# Patient Record
Sex: FEMALE | Race: WHITE | NOT HISPANIC OR LATINO
[De-identification: names, ages, dates, MRNs, and addresses within clinical notes are randomized per-mention and may not be internally consistent; named-entity substitution may affect disease eponyms.]

---

## 2021-09-13 ENCOUNTER — TRANSCRIPTION ENCOUNTER (OUTPATIENT)
Age: 41
End: 2021-09-13

## 2022-05-10 ENCOUNTER — NON-APPOINTMENT (OUTPATIENT)
Age: 42
End: 2022-05-10

## 2022-05-10 PROBLEM — Z00.00 ENCOUNTER FOR PREVENTIVE HEALTH EXAMINATION: Status: ACTIVE | Noted: 2022-05-10

## 2022-05-11 ENCOUNTER — APPOINTMENT (OUTPATIENT)
Dept: INTERNAL MEDICINE | Facility: CLINIC | Age: 42
End: 2022-05-11
Payer: COMMERCIAL

## 2022-05-11 ENCOUNTER — NON-APPOINTMENT (OUTPATIENT)
Age: 42
End: 2022-05-11

## 2022-05-11 VITALS
HEART RATE: 83 BPM | WEIGHT: 143 LBS | HEIGHT: 70 IN | DIASTOLIC BLOOD PRESSURE: 70 MMHG | BODY MASS INDEX: 20.47 KG/M2 | SYSTOLIC BLOOD PRESSURE: 105 MMHG | OXYGEN SATURATION: 99 % | TEMPERATURE: 97.6 F

## 2022-05-11 DIAGNOSIS — K62.3 RECTAL PROLAPSE: ICD-10-CM

## 2022-05-11 DIAGNOSIS — R92.2 INCONCLUSIVE MAMMOGRAM: ICD-10-CM

## 2022-05-11 DIAGNOSIS — Z83.438 FAMILY HISTORY OF OTHER DISORDER OF LIPOPROTEIN METABOLISM AND OTHER LIPIDEMIA: ICD-10-CM

## 2022-05-11 DIAGNOSIS — Z11.9 ENCOUNTER FOR SCREENING FOR INFECTIOUS AND PARASITIC DISEASES, UNSPECIFIED: ICD-10-CM

## 2022-05-11 DIAGNOSIS — Z12.39 ENCOUNTER FOR OTHER SCREENING FOR MALIGNANT NEOPLASM OF BREAST: ICD-10-CM

## 2022-05-11 DIAGNOSIS — Z00.00 ENCOUNTER FOR GENERAL ADULT MEDICAL EXAMINATION W/OUT ABNORMAL FINDINGS: ICD-10-CM

## 2022-05-11 DIAGNOSIS — R10.9 UNSPECIFIED ABDOMINAL PAIN: ICD-10-CM

## 2022-05-11 DIAGNOSIS — Z80.8 FAMILY HISTORY OF MALIGNANT NEOPLASM OF OTHER ORGANS OR SYSTEMS: ICD-10-CM

## 2022-05-11 DIAGNOSIS — Z80.3 FAMILY HISTORY OF MALIGNANT NEOPLASM OF BREAST: ICD-10-CM

## 2022-05-11 DIAGNOSIS — G89.29 UNSPECIFIED ABDOMINAL PAIN: ICD-10-CM

## 2022-05-11 PROCEDURE — 99214 OFFICE O/P EST MOD 30 MIN: CPT | Mod: 25

## 2022-05-11 PROCEDURE — G0444 DEPRESSION SCREEN ANNUAL: CPT

## 2022-05-11 PROCEDURE — 93000 ELECTROCARDIOGRAM COMPLETE: CPT | Mod: 59

## 2022-05-11 PROCEDURE — G0442 ANNUAL ALCOHOL SCREEN 15 MIN: CPT

## 2022-05-11 PROCEDURE — 36415 COLL VENOUS BLD VENIPUNCTURE: CPT

## 2022-05-11 PROCEDURE — 99386 PREV VISIT NEW AGE 40-64: CPT | Mod: 25

## 2022-05-11 RX ORDER — PSYLLIUM SEED
63 PACKET (EA) ORAL
Refills: 0 | Status: ACTIVE | COMMUNITY
Start: 2022-05-11

## 2022-05-11 NOTE — PHYSICAL EXAM
[No Acute Distress] : no acute distress [Normal Sclera/Conjunctiva] : normal sclera/conjunctiva [PERRL] : pupils equal round and reactive to light [EOMI] : extraocular movements intact [No JVD] : no jugular venous distention [No Respiratory Distress] : no respiratory distress  [Clear to Auscultation] : lungs were clear to auscultation bilaterally [Normal Rate] : normal rate  [Regular Rhythm] : with a regular rhythm [Normal S1, S2] : normal S1 and S2 [No Murmur] : no murmur heard [No Carotid Bruits] : no carotid bruits [Pedal Pulses Present] : the pedal pulses are present [No Edema] : there was no peripheral edema [Soft] : abdomen soft [Non Tender] : non-tender [Normal Bowel Sounds] : normal bowel sounds [Declined Rectal Exam] : declined rectal exam [Normal Supraclavicular Nodes] : no supraclavicular lymphadenopathy [Normal Axillary Nodes] : no axillary lymphadenopathy [Normal Posterior Cervical Nodes] : no posterior cervical lymphadenopathy [Normal Anterior Cervical Nodes] : no anterior cervical lymphadenopathy [No CVA Tenderness] : no CVA  tenderness [No Spinal Tenderness] : no spinal tenderness [Normal] : affect was normal and insight and judgment were intact [No Joint Swelling] : no joint swelling [Grossly Normal Strength/Tone] : grossly normal strength/tone [No Rash] : no rash [Well-Appearing] : well-appearing [Normal Outer Ear/Nose] : the outer ears and nose were normal in appearance [Normal TMs] : both tympanic membranes were normal [No Lymphadenopathy] : no lymphadenopathy [No Nipple Discharge] : no nipple discharge [No Axillary Lymphadenopathy] : no axillary lymphadenopathy [de-identified] : Wears contacts [de-identified] : Dense breasts [de-identified] : Healed  scar [FreeTextEntry1] : Patient is having her menstrual cycle [de-identified] : Multiple moles.  Multiple tattoos

## 2022-05-11 NOTE — HEALTH RISK ASSESSMENT
[Very Good] : ~his/her~  mood as very good [Current] : Current [Yes] : Yes [2 - 4 times a month (2 pts)] : 2-4 times a month (2 points) [1 or 2 (0 pts)] : 1 or 2 (0 points) [Never (0 pts)] : Never (0 points) [No falls in past year] : Patient reported no falls in the past year [0] : 2) Feeling down, depressed, or hopeless: Not at all (0) [HIV Test offered] : HIV Test offered [Hepatitis C test offered] : Hepatitis C test offered [Graduate School] : graduate school [] :  [# Of Children ___] : has [unfilled] children [5-9] : 5-9 [PHQ-2 Negative - No further assessment needed] : PHQ-2 Negative - No further assessment needed [Audit-CScore] : 2 [VMF8Crmbp] : 0 [MammogramDate] : 2019 [PapSmearDate] : 2019 [BoneDensityDate] : N/A [ColonoscopyDate] : N/A [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

## 2022-05-11 NOTE — REVIEW OF SYSTEMS
[Palpitations] : palpitations [Redness] : redness [Abdominal Pain] : abdominal pain [Fever] : no fever [Chills] : no chills [Discharge] : no discharge [Pain] : no pain [Earache] : no earache [Hoarseness] : no hoarseness [Postnasal Drip] : no postnasal drip [Chest Pain] : no chest pain [Lower Ext Edema] : no lower extremity edema [Nausea] : no nausea [Vomiting] : no vomiting [Dysuria] : no dysuria [Incontinence] : no incontinence [Joint Pain] : no joint pain [Joint Stiffness] : no joint stiffness [Joint Swelling] : no joint swelling [Itching] : no itching [Mole Changes] : no mole changes [Skin Rash] : no skin rash [Headache] : no headache [Dizziness] : no dizziness [Suicidal] : not suicidal [Anxiety] : no anxiety [Depression] : no depression [Easy Bleeding] : no easy bleeding [Swollen Glands] : no swollen glands [FreeTextEntry3] : last eye exam 12/21 [FreeTextEntry7] : She has had intermittent constipation

## 2022-05-11 NOTE — HISTORY OF PRESENT ILLNESS
[FreeTextEntry1] : New patient/establish medical care [de-identified] : 42 y/o here to establish medical care\par 1. She has a a h/o Rectal Prolapse; uses Metamucil when constipated\par 2. She has intermittent pain in her left lower abdomen. Pain increases when she has to defecate.  \par US of Abdomen negative.\par 3.  12 yrs ago she had episodes of Palpitation.  She was seen by a Cardiologist.  24 Hour Holter by her patient's report with PVC.  She continues to have palpitations intermittently.   Of note, symptoms occur while she is in bed.  No nausea, dizziness, no chest pain.\par Had COVID Pfizer x 2; Moderna x 1\par

## 2022-05-24 ENCOUNTER — APPOINTMENT (OUTPATIENT)
Dept: SURGERY | Facility: CLINIC | Age: 42
End: 2022-05-24
Payer: COMMERCIAL

## 2022-05-24 VITALS
SYSTOLIC BLOOD PRESSURE: 134 MMHG | DIASTOLIC BLOOD PRESSURE: 87 MMHG | RESPIRATION RATE: 18 BRPM | OXYGEN SATURATION: 100 % | HEIGHT: 70 IN | WEIGHT: 142 LBS | BODY MASS INDEX: 20.33 KG/M2 | HEART RATE: 69 BPM

## 2022-05-24 DIAGNOSIS — Z87.891 PERSONAL HISTORY OF NICOTINE DEPENDENCE: ICD-10-CM

## 2022-05-24 DIAGNOSIS — Z86.59 PERSONAL HISTORY OF OTHER MENTAL AND BEHAVIORAL DISORDERS: ICD-10-CM

## 2022-05-24 DIAGNOSIS — K64.5 PERIANAL VENOUS THROMBOSIS: ICD-10-CM

## 2022-05-24 DIAGNOSIS — Z78.9 OTHER SPECIFIED HEALTH STATUS: ICD-10-CM

## 2022-05-24 PROCEDURE — 46600 DIAGNOSTIC ANOSCOPY SPX: CPT

## 2022-05-24 PROCEDURE — 99204 OFFICE O/P NEW MOD 45 MIN: CPT | Mod: 25

## 2022-05-24 RX ORDER — IBUPROFEN 200 MG/1
CAPSULE, LIQUID FILLED ORAL
Refills: 0 | Status: ACTIVE | COMMUNITY

## 2022-05-31 PROBLEM — K64.5 THROMBOSED EXTERNAL HEMORRHOID: Status: ACTIVE | Noted: 2022-05-31

## 2022-05-31 NOTE — HISTORY OF PRESENT ILLNESS
[FreeTextEntry1] : 41-year-old female here for initial evaluation for possible rectal prolapse.  Patient referred by Dr. Simental.  Patient has noted for a couple years now some tissue that comes out with bowel movements.  She believes it is relatively large in size but is not completely sure.  Denies any bleeding with bowel movements.  Denies any issues with fecal or urinary incontinence.  No prior colonoscopy.  No significant other medical history.  OB history significant for prior pregnancy with  no prior vaginal deliveries.  Patient thinks that she may feel some descent of the anterior compartment with bowel movements but she has not had significant uterine or vaginal prolapse.

## 2022-05-31 NOTE — PHYSICAL EXAM
[Abdomen Masses] : No abdominal masses [Abdomen Tenderness] : ~T No ~M abdominal tenderness [Excoriation] : no perianal excoriation [Fistula] : no fistulas [Tender, Swollen] : nontender, non-swollen [Normal] : was normal [None] : there was no rectal mass  [JVD] : no jugular venous distention  [Respiratory Effort] : normal respiratory effort [No Rash or Lesion] : No rash or lesion [Alert] : alert [Calm] : calm [de-identified] : Small left lateral thrombosed external hemorrhoid. [de-identified] : No acute distress

## 2022-05-31 NOTE — PROCEDURE
[FreeTextEntry1] : Anoscopy: Anoscopic exam performed with lighted anoscope.  Normal distal anorectal mucosa.  Mild to moderate internal hemorrhoids.  No masses identified.  No active bleeding.  Left lateral thrombosed hemorrhoid noted.\par \par Patient then proceeded to the bathroom to perform Valsalva maneuver such as an attempt to induce prolapse.  On examination the patient did not have significant full-thickness prolapse.  She potentially has some mucosal prolapse on the right aspect as well as the prolapsing thrombosed hemorrhoid.

## 2022-05-31 NOTE — ASSESSMENT
[FreeTextEntry1] : 41-year-old female here for initial evaluation for possible rectal prolapse.  Based on examination today and her patient's history is not clear that the patient has a full-thickness rectal prolapse.  Patient believes it is sometimes bigger at home so she will attempt to take a picture to document prolapse.\par \par Discussed potential treatment options for prolapse if she does not fact have full-thickness rectal prolapse.  Options discussed included abdominal approach as well as perineal approaches.  Given her young age and overall health would recommend an abdominal approach with either a suture rectopexy versus a ventral mesh rectopexy.  Discussed potential benefits and downsides of each approach.  Given the patient does not clearly have a diagnosis of full-thickness rectal prolapse we will reassess based on imaging that she is able to obtain.  She does have some notable internal hemorrhoids as well as a thrombosed external hemorrhoids as possible as she is only having prolapse of hemorrhoidal tissue at this time.\par \par Will reassess if patient has further symptoms and proceed accordingly.

## 2022-06-01 ENCOUNTER — APPOINTMENT (OUTPATIENT)
Dept: CARDIOLOGY | Facility: CLINIC | Age: 42
End: 2022-06-01

## 2022-06-01 VITALS
TEMPERATURE: 97.6 F | SYSTOLIC BLOOD PRESSURE: 90 MMHG | OXYGEN SATURATION: 98 % | WEIGHT: 142 LBS | BODY MASS INDEX: 20.33 KG/M2 | HEART RATE: 69 BPM | DIASTOLIC BLOOD PRESSURE: 70 MMHG | HEIGHT: 70 IN

## 2022-06-01 DIAGNOSIS — R00.2 PALPITATIONS: ICD-10-CM

## 2022-06-01 PROCEDURE — 99215 OFFICE O/P EST HI 40 MIN: CPT | Mod: 25

## 2022-06-01 PROCEDURE — 93248 EXT ECG>7D<15D REV&INTERPJ: CPT

## 2022-06-08 ENCOUNTER — RESULT REVIEW (OUTPATIENT)
Age: 42
End: 2022-06-08

## 2022-06-14 LAB
ALBUMIN SERPL ELPH-MCNC: 4.6 G/DL
ALP BLD-CCNC: 59 U/L
ALT SERPL-CCNC: 9 U/L
ANION GAP SERPL CALC-SCNC: 11 MMOL/L
AST SERPL-CCNC: 13 U/L
BASOPHILS # BLD AUTO: 0.02 K/UL
BASOPHILS NFR BLD AUTO: 0.3 %
BILIRUB SERPL-MCNC: 0.6 MG/DL
BUN SERPL-MCNC: 12 MG/DL
CALCIUM SERPL-MCNC: 9.1 MG/DL
CHLORIDE SERPL-SCNC: 108 MMOL/L
CHOLEST SERPL-MCNC: 152 MG/DL
CO2 SERPL-SCNC: 22 MMOL/L
CREAT SERPL-MCNC: 0.73 MG/DL
EGFR: 106 ML/MIN/1.73M2
EOSINOPHIL # BLD AUTO: 0.07 K/UL
EOSINOPHIL NFR BLD AUTO: 1.2 %
GLUCOSE SERPL-MCNC: 80 MG/DL
HCT VFR BLD CALC: 35.6 %
HCV AB SER QL: NONREACTIVE
HCV S/CO RATIO: 0.11 S/CO
HDLC SERPL-MCNC: 69 MG/DL
HGB BLD-MCNC: 11.4 G/DL
HIV1+2 AB SPEC QL IA.RAPID: NONREACTIVE
IMM GRANULOCYTES NFR BLD AUTO: 0.3 %
LDLC SERPL CALC-MCNC: 75 MG/DL
LYMPHOCYTES # BLD AUTO: 2.69 K/UL
LYMPHOCYTES NFR BLD AUTO: 44.9 %
MAN DIFF?: NORMAL
MCHC RBC-ENTMCNC: 28.4 PG
MCHC RBC-ENTMCNC: 32 GM/DL
MCV RBC AUTO: 88.8 FL
MONOCYTES # BLD AUTO: 0.36 K/UL
MONOCYTES NFR BLD AUTO: 6 %
NEUTROPHILS # BLD AUTO: 2.83 K/UL
NEUTROPHILS NFR BLD AUTO: 47.3 %
NONHDLC SERPL-MCNC: 83 MG/DL
PLATELET # BLD AUTO: 202 K/UL
POTASSIUM SERPL-SCNC: 4.1 MMOL/L
PROT SERPL-MCNC: 6.8 G/DL
RBC # BLD: 4.01 M/UL
RBC # FLD: 13 %
SODIUM SERPL-SCNC: 141 MMOL/L
TRIGL SERPL-MCNC: 39 MG/DL
TSH SERPL-ACNC: 2.54 UIU/ML
WBC # FLD AUTO: 5.99 K/UL

## 2022-06-22 ENCOUNTER — RESULT REVIEW (OUTPATIENT)
Age: 42
End: 2022-06-22

## 2022-06-23 ENCOUNTER — NON-APPOINTMENT (OUTPATIENT)
Age: 42
End: 2022-06-23

## 2022-07-07 ENCOUNTER — OUTPATIENT (OUTPATIENT)
Dept: OUTPATIENT SERVICES | Facility: HOSPITAL | Age: 42
LOS: 1 days | Discharge: HOME | End: 2022-07-07

## 2022-07-07 DIAGNOSIS — Z15.89 GENETIC SUSCEPTIBILITY TO OTHER DISEASE: ICD-10-CM

## 2022-07-18 ENCOUNTER — APPOINTMENT (OUTPATIENT)
Dept: HEMATOLOGY ONCOLOGY | Facility: CLINIC | Age: 42
End: 2022-07-18

## 2022-07-19 NOTE — DISCUSSION/SUMMARY
[FreeTextEntry1] : The visit was provided via telehealth using real-time 2-way audio visual technology. The patient, Yi Sheets, was located at home, [place], [place], at the time of the visit. The provider, Adamaris Lopez, was located at the medical office located in [place], [place] at the time of the visit. The patient, Yi Sheets, and Provider participated in the telehealth encounter. Verbal consent for telehealth services was given on 2022 by the patient, Yi Sheets.\par \par REASON FOR CONSULT\par Yi Sheets is a 41-year-old female referred by Dr. Patricia Simental for cancer genetic counseling and risk assessment due to a family history of breast cancer. Ms. Sheets was seen on 2022 at which time medical and family history was ascertained and a pedigree constructed. \par \par RELEVANT MEDICAL HISTORY\par Ms. Sheets is a healthy individual with no reported history of cancer. She has a family history of cancer, see below.\par \par OTHER MEDICAL AND SURGICAL HISTORY:\par •	Medical History: No major medical history reported\par •	Surgical History: wisdom tooth extraction, skin lesion excision,  section\par \par OB/GYN HISTORY:\par Obstetrical History: \par Age at Menarche: 12-13\par Menopausal Status: Premenopausal \par Age at First Live Birth: 34\par Oral Contraceptive Use: Yes, intermittent use\par Other Contraceptive Use: NuvaRing in late teens-20’s, Depo-Provera shot\par Hormone Replacement Therapy: No\par \par CANCER SCREENING HISTORY:  \par Breast: \par •	Mammography: last 2022, normal\par •	Sonography: last 2022, normal\par •	MRI: No\par •	Biopsies: No\par GYN:\par •	Pelvic Examination: last was a few years ago, she reports she has an appointment scheduled in 2022. She reports the last gynecological exam was normal.\par •	Sonography: No\par •	CA-125: No\par Colon:\par •	Colonoscopy: No\par •	Upper Endoscopy: No \par •	FOBT: No\par Skin:  \par •	FBSE: Yes\par •	Lesions biopsied/removed: She reports having moles removed for cosmetic reasons including an excision of a lesion that was reportedly benign during her childhood.\par \par SOCIAL HISTORY:\par •	Tobacco-product use: Yes, former (12 years in total)\par •	Environmental exposures: No\par \par FAMILY HISTORY:\par Maternal ancestry was reported as English, Haitian, and Yoruba and paternal ancestry was reported as Argentine. Ashkenazi Sabianism ancestry and consanguinity were denied. A detailed family history of cancer was ascertained, see below and scanned chart for pedigree. \par \par According to Ms. Sheets, no one in the family has had germline testing for cancer susceptibility. \par 	\par 	RISK ASSESSMENT:\par Ms. Sheets’s family history is suggestive of a hereditary cancer syndrome given her mother’s breast cancer diagnosis in her mid-40’s and her limited family structure on her paternal side of the family. The patient meets National Comprehensive Cancer Network (NCCN) criteria for genetic testing. We recommended genetic testing for genes associated with breast cancer. This test analyzes [11] genes: HELENA, BARD1, BRCA1, BRCA2, CDH1, CHEK2, NF1, PALB2, PTEN, STK11, and TP53.\par \par The risks, benefits and limitations of genetic testing were discussed with Ms. Sheets. In addition, we discussed the purpose of genetic testing and possible test results (positive, negative, inconclusive) along with associated medical management options and psychosocial implications. Insurance coverage and potential out of pocket costs were also discussed. The Genetic Information Non-discrimination Act (VERA) was reviewed.\par \par It was explained that risk assessment is based upon medical and family history as provided and may change in the future should new information be obtained. \par \par Following our discussion, Ms. Sheets verbally consented to the above-mentioned genetic testing panel. [facility] will send a saliva sample kit to the patient’s home to submit a sample for genetic testing.\par \par PLAN:\par \par 1.	A saliva kit was ordered today through [facility] and will be sent to Ms. Sheets’s home to submit a sample for genetic testing.\par 2.	We will contact Ms. Sheets to schedule a follow-up appointment once the results are available. Results generally return in 2-3 weeks after the laboratory has received the patient’s sample.\par \par For any additional questions please call Cancer Genetics at [phone]. \par \par Adamaris Lopez, MS\par Genetic Counselor, Cancer Genetics\par \par

## 2022-07-26 ENCOUNTER — NON-APPOINTMENT (OUTPATIENT)
Age: 42
End: 2022-07-26

## 2022-07-26 ENCOUNTER — APPOINTMENT (OUTPATIENT)
Dept: GASTROENTEROLOGY | Facility: CLINIC | Age: 42
End: 2022-07-26

## 2022-07-26 VITALS
OXYGEN SATURATION: 99 % | HEIGHT: 70 IN | TEMPERATURE: 96 F | BODY MASS INDEX: 20.33 KG/M2 | DIASTOLIC BLOOD PRESSURE: 70 MMHG | HEART RATE: 63 BPM | WEIGHT: 142 LBS | SYSTOLIC BLOOD PRESSURE: 100 MMHG

## 2022-07-26 DIAGNOSIS — K62.3 RECTAL PROLAPSE: ICD-10-CM

## 2022-07-26 PROCEDURE — 99203 OFFICE O/P NEW LOW 30 MIN: CPT

## 2022-07-26 NOTE — HISTORY OF PRESENT ILLNESS
[FreeTextEntry1] : 41 year old F PMH csection 2015, irregular heart beat no interventions recommended ?rectal prolapse, hemorrhoids presents for evaluation of lower abd pain. \par She is seen at the request of Dr. Patricia Simental. \par \par \par left lower abd pain , sharp intermittent at times for 10 years. pain lasts 30 seconds at a time, goes away without intervention. no change in frequency or severity over past 10 years\par \par \par comes and goes, worse when on menses or dehydrated. can have 4-5 episodes over a few days , then no pain for weeks. \par \par if she hydrates, takes Metamucil, active then she can go long periods of time . pain episodes only come when she is not doing these things. \par pain does not wake her up at night. \par she did not get this pain when she was  pregnant. \par \par she has occ constipation, started herself on Metamucil for this and for hemorrhoids.\par   \par \par hemorrhoids ?prolapse has been an issue for past 3-5 years. worsening. she has seen Dr. Castillo\par \par periods are getting heavier. she will see gyn in 10/2022\par \par labs 05/2022\par Hgb 11. 4, she reports lifelong borderline anemia. \par \par she had previous GYN US that was unrevealing and possible Abd US 3 yrs ago. \par \par no prior CT scan \par She has not seen a GI physician.\par \par No prior colonoscopy\par Patient denies  n/v/heartburn, reflux, dysphagia/odynophagia, change in bowel habits, diarrhea, brbpr, melena. no weight loss.  Good appetite and energy level. Patient has daily BM, denies regular NSAID use. \par \par fam hx father-  colon polyps, colon cancer\par mother breast ca dx in her mid 40s

## 2022-07-26 NOTE — PHYSICAL EXAM
[General Appearance - Alert] : alert [Sclera] : the sclera and conjunctiva were normal [Outer Ear] : the ears and nose were normal in appearance [Neck Appearance] : the appearance of the neck was normal [] : no respiratory distress [Apical Impulse] : the apical impulse was normal [Abdomen Soft] : soft [Abdomen Tenderness] : non-tender [Abnormal Walk] : normal gait [Skin Color & Pigmentation] : normal skin color and pigmentation [Oriented To Time, Place, And Person] : oriented to person, place, and time [FreeTextEntry1] : deferred

## 2022-07-26 NOTE — ASSESSMENT
[FreeTextEntry1] : LLQ pain, chronic /stable, lasts 30 seconds, likely benign etiology consider colonic spasm  \par pain associated with stopping Metamucil/poor hydration and menses. \par CT A/P\par Colonoscopy\par Risks (including but not limited to sedation risks, infection, bleeding, perforation, possibility of missed lesions), benefits and alternatives to procedure, including not doing the procedure, were discussed with patient. Patient understood and agreed to proceed with colonoscopy. \par Colonoscopy preparation instructions reviewed with patient.\par Gyn eval scheduled \par \par

## 2022-08-03 ENCOUNTER — NON-APPOINTMENT (OUTPATIENT)
Age: 42
End: 2022-08-03

## 2022-08-03 NOTE — DISCUSSION/SUMMARY
[FreeTextEntry1] : REASON FOR CONSULT\par Yi Sheets is a 41-year-old female who was contacted on 08/03/2022 for a discussion regarding her negative genetic testing results related to hereditary cancer predisposition. This session was conducted via telephone. \par \par Ms. Sheets was originally seen by the Cancer Genetics Service on 07/18/2022 for hereditary cancer predisposition risk assessment due to a family history of breast cancer. At that time, Ms. Sheets decided to pursue genetic testing for genes associated with breast cancer offered by [facility]par \par TEST RESULTS: NEGATIVE\par NO pathogenic (disease-causing) variants or variants of uncertain significance were detected in any of the following genes [11]:  HELENA, BARD1, BRCA1, BRCA2, CDH1, CHEK2, NF1, PALB2, PTEN, STK11, and TP53.\par \par RESULTS INTERPRETATION AND ASSESSMENT:\par Given Ms. Sheets’s current reported family history of cancer, and her negative genetic test results, the following screening guidelines and risk-reducing recommendations were discussed:\par \par BREAST: \par •	The Tyrer-Cuzick risk evaluation model (v8) is used to calculate breast cancer risk based on family history of cancer and other clinical variables. An updated 25.3% remaining lifetime risk of breast cancer was quoted. As per National Comprehensive Cancer Network (NCCN) guidelines, women with a remaining lifetime breast cancer risk >20% using this model should consider high risk breast cancer screening with annual mammogram and annual breast MRI. Therefore, Ms. Sheets was recommended to include a breast MRI in her annual breast imaging in the future. \par •	Ms. Sheets was provided with the contact information for Dr. Adi Berry to schedule a consultation regarding adding breast MRI to her current breast cancer screening protocol.  \par \par OTHER:\par •	In the absence of other indications, Ms. Sheets should practice age-appropriate cancer screening of other organ systems as recommended for the general population.\par \par We also discussed the limitations of negative results:\par 1.	The cause of Ms. Sheets’s family history of cancer remains unknown. The cancer(s) may have developed randomly, or due to environmental factors.  \par 2.	This negative result does not completely rule out a hereditary basis for the reported personal and/or family history due to limitations in technology or a variant being present in an unidentified gene. \par 3.	Variants in other genes would not be identified by this analysis, so this negative result does not rule out the likelihood of having a mutation in a different hereditary cancer gene or the possibility of ever developing cancer.\par 4.	It is possible there is a hereditary cancer predisposition gene mutation in the family, but the patient did not inherit it. \par \par We informed Ms. Sheets that our knowledge of genetics and inherited cancer conditions is changing rapidly. Therefore, we recommended that Ms. Sheets contact our office, every 2 to 3 years, to discuss relevant advances in cancer genetics.  We emphasized the importance of re-contacting us with updates regarding her personal and family history of cancer as well as any updates regarding additional cancer genetic test results performed for the patient and/or family members.  Such updates could possibly change our risk assessment and recommendations. \par \par PLAN:\par 1.	Based on her family history of breast cancer, the patient may consider increased screening via breast MRI (see discussion above). She was provided with the contact information for Dr. Adi Berry to schedule a consultation regarding this recommendation. \par 2.	Patient informed consult note(s) will be available through their [facility] patient portal and genetic test results will be released via [facility]’s portal.\par 3.	Ms. Sheets was encouraged to contact us every 2-3 years to discuss relevant advances in cancer genetics, or sooner if there are any changes in her personal or family history of cancer.\par \par For any additional questions please call Cancer Genetics at [phone]. \par \par Adamaris Lopez MS\par Genetic Counselor, Cancer Genetics\par \par \par \par

## 2022-08-23 ENCOUNTER — RESULT REVIEW (OUTPATIENT)
Age: 42
End: 2022-08-23

## 2022-08-31 DIAGNOSIS — N83.201 UNSPECIFIED OVARIAN CYST, RIGHT SIDE: ICD-10-CM

## 2022-08-31 DIAGNOSIS — N83.202 UNSPECIFIED OVARIAN CYST, RIGHT SIDE: ICD-10-CM

## 2022-09-13 ENCOUNTER — RESULT REVIEW (OUTPATIENT)
Age: 42
End: 2022-09-13

## 2022-09-13 ENCOUNTER — APPOINTMENT (OUTPATIENT)
Dept: UROLOGY | Facility: CLINIC | Age: 42
End: 2022-09-13

## 2022-09-13 VITALS — HEART RATE: 62 BPM | SYSTOLIC BLOOD PRESSURE: 110 MMHG | DIASTOLIC BLOOD PRESSURE: 78 MMHG | TEMPERATURE: 98.3 F

## 2022-09-13 DIAGNOSIS — G89.29 LEFT LOWER QUADRANT PAIN: ICD-10-CM

## 2022-09-13 DIAGNOSIS — R10.32 LEFT LOWER QUADRANT PAIN: ICD-10-CM

## 2022-09-13 PROCEDURE — 99203 OFFICE O/P NEW LOW 30 MIN: CPT

## 2022-09-21 PROBLEM — R10.32 ABDOMINAL PAIN, CHRONIC, LEFT LOWER QUADRANT: Status: ACTIVE | Noted: 2022-07-26

## 2022-09-21 NOTE — PHYSICAL EXAM
[General Appearance - Well Developed] : well developed [Normal Appearance] : normal appearance [Edema] : no peripheral edema [Respiration, Rhythm And Depth] : normal respiratory rhythm and effort [Abdomen Soft] : soft [Costovertebral Angle Tenderness] : no ~M costovertebral angle tenderness [Normal Station and Gait] : the gait and station were normal for the patient's age [Skin Color & Pigmentation] : normal skin color and pigmentation [] : no rash [Oriented To Time, Place, And Person] : oriented to person, place, and time

## 2022-09-21 NOTE — ASSESSMENT
[FreeTextEntry1] : Patient is a 41 year female who presents with left hydronephrosis noted on CT Scan done for left lower quadrant pain.  The pain is dull and constant 4-6/10 ache; no modifying factors No  issues such as hematuria or dysuria.\par No history of kidney stones or recurrent UTIs.  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  No obvious lesion or stone.\par \par A/P\par 1. left lower quadrant pain\par 2. left hydronephrosis\par \par discussed findings with patient and likely congenital or acquired from crossing vessel\par will get renal lasix scan with split function \par will call with results\par

## 2022-10-04 ENCOUNTER — RESULT REVIEW (OUTPATIENT)
Age: 42
End: 2022-10-04

## 2022-10-11 ENCOUNTER — APPOINTMENT (OUTPATIENT)
Dept: UROLOGY | Facility: CLINIC | Age: 42
End: 2022-10-11

## 2022-10-11 DIAGNOSIS — N13.30 UNSPECIFIED HYDRONEPHROSIS: ICD-10-CM

## 2022-10-11 PROCEDURE — 99441: CPT

## 2022-10-19 PROBLEM — N13.30 HYDRONEPHROSIS, LEFT: Status: ACTIVE | Noted: 2022-09-13

## 2022-10-25 ENCOUNTER — RESULT REVIEW (OUTPATIENT)
Age: 42
End: 2022-10-25

## 2022-12-16 ENCOUNTER — RESULT REVIEW (OUTPATIENT)
Age: 42
End: 2022-12-16

## 2022-12-18 ENCOUNTER — RESULT REVIEW (OUTPATIENT)
Age: 42
End: 2022-12-18

## 2022-12-19 ENCOUNTER — RESULT REVIEW (OUTPATIENT)
Age: 42
End: 2022-12-19

## 2022-12-19 ENCOUNTER — APPOINTMENT (OUTPATIENT)
Dept: GASTROENTEROLOGY | Facility: HOSPITAL | Age: 42
End: 2022-12-19

## 2022-12-19 PROCEDURE — 45380 COLONOSCOPY AND BIOPSY: CPT

## 2022-12-20 ENCOUNTER — NON-APPOINTMENT (OUTPATIENT)
Age: 42
End: 2022-12-20

## 2022-12-21 ENCOUNTER — APPOINTMENT (OUTPATIENT)
Dept: SURGERY | Facility: CLINIC | Age: 42
End: 2022-12-21
Payer: COMMERCIAL

## 2022-12-21 VITALS
WEIGHT: 140 LBS | SYSTOLIC BLOOD PRESSURE: 114 MMHG | HEIGHT: 70 IN | HEART RATE: 73 BPM | BODY MASS INDEX: 20.04 KG/M2 | OXYGEN SATURATION: 99 % | RESPIRATION RATE: 18 BRPM | DIASTOLIC BLOOD PRESSURE: 71 MMHG

## 2022-12-21 PROCEDURE — 99212 OFFICE O/P EST SF 10 MIN: CPT

## 2023-01-03 ENCOUNTER — NON-APPOINTMENT (OUTPATIENT)
Age: 43
End: 2023-01-03

## 2023-01-06 ENCOUNTER — APPOINTMENT (OUTPATIENT)
Dept: OBGYN | Facility: CLINIC | Age: 43
End: 2023-01-06

## 2023-05-10 ENCOUNTER — APPOINTMENT (OUTPATIENT)
Dept: SURGERY | Facility: CLINIC | Age: 43
End: 2023-05-10
Payer: COMMERCIAL

## 2023-05-10 VITALS
SYSTOLIC BLOOD PRESSURE: 112 MMHG | DIASTOLIC BLOOD PRESSURE: 79 MMHG | HEIGHT: 70 IN | WEIGHT: 145 LBS | HEART RATE: 61 BPM | BODY MASS INDEX: 20.76 KG/M2

## 2023-05-10 DIAGNOSIS — K64.8 OTHER HEMORRHOIDS: ICD-10-CM

## 2023-05-10 PROCEDURE — 99213 OFFICE O/P EST LOW 20 MIN: CPT

## 2023-05-10 NOTE — HISTORY OF PRESENT ILLNESS
[FreeTextEntry1] : 41-year-old female here for initial evaluation for possible rectal prolapse.  Patient referred by Dr. Simental.  Patient has noted for a couple years now some tissue that comes out with bowel movements.  She believes it is relatively large in size but is not completely sure.  Denies any bleeding with bowel movements.  Denies any issues with fecal or urinary incontinence.  No prior colonoscopy.  No significant other medical history.  OB history significant for prior pregnancy with  no prior vaginal deliveries.  Patient thinks that she may feel some descent of the anterior compartment with bowel movements but she has not had significant uterine or vaginal prolapse.\par \par Seen today for follow up. Sent photos which were most suspicious for hemorrhoids with some prolapse of internal hemorrhoids. Remains symptomatic. INterested in a procedure to fix hemorrhodis going forward. \par \par Returns for follow up, was unable to schedule hemorrhoidectomy last visit due to concerns over recovery and work, now free to schedule. Symptoms remain the same. Would like to proceed with excisional hemorrhoidectomy.

## 2023-05-10 NOTE — HISTORY OF PRESENT ILLNESS
[FreeTextEntry1] : 41-year-old female here for initial evaluation for possible rectal prolapse.  Patient referred by Dr. Simental.  Patient has noted for a couple years now some tissue that comes out with bowel movements.  She believes it is relatively large in size but is not completely sure.  Denies any bleeding with bowel movements.  Denies any issues with fecal or urinary incontinence.  No prior colonoscopy.  No significant other medical history.  OB history significant for prior pregnancy with  no prior vaginal deliveries.  Patient thinks that she may feel some descent of the anterior compartment with bowel movements but she has not had significant uterine or vaginal prolapse.\par \par Seen today for follow up. Sent photos which were most suspicious for hemorrhoids with some prolapse of internal hemorrhoids. Remains symptomatic. INterested in a procedure to fix hemorrhodis going forward.

## 2023-05-10 NOTE — PHYSICAL EXAM
[Abdomen Masses] : No abdominal masses [Abdomen Tenderness] : ~T No ~M abdominal tenderness [Excoriation] : no perianal excoriation [Fistula] : no fistulas [Tender, Swollen] : nontender, non-swollen [Manually Reducible] : a manually reducible (grade III) [Normal] : was normal [None] : there was no rectal mass  [JVD] : no jugular venous distention  [Respiratory Effort] : normal respiratory effort [No Rash or Lesion] : No rash or lesion [Alert] : alert [Calm] : calm [de-identified] : Small left lateral external hemorrhoid. [de-identified] : No acute distress

## 2023-05-10 NOTE — PHYSICAL EXAM
[Normal] : was normal [None] : there was no rectal mass  [Respiratory Effort] : normal respiratory effort [No Rash or Lesion] : No rash or lesion [Alert] : alert [Calm] : calm [Abdomen Masses] : No abdominal masses [Abdomen Tenderness] : ~T No ~M abdominal tenderness [Excoriation] : no perianal excoriation [Fistula] : no fistulas [Tender, Swollen] : nontender, non-swollen [JVD] : no jugular venous distention  [de-identified] : Small left lateral external hemorrhoid. [de-identified] : No acute distress

## 2023-05-10 NOTE — ASSESSMENT
[FreeTextEntry1] : Ms. CURRY is a 42 year female who presents for symptomatic hemorrhoids. \par \par Discussed management options for hemorrhoids including medical management with fiber supplements, increased fluid intake, and symptom management during acute flares, office based procedures including rubber band ligation, transanal hemorrhoidal dearterialization and surgical excision. \par \par Given the findings today on exam recommend surgical management with excisional hemorrhoidectomy.\par Discussed details of surgery including post-operative care and recovery. Should expect 10-14 days of pain that will improve each day. Can take 4-6 weeks to heal completely from the incisions. Small amounts of bleeding are normal and should resolve relatively soon after surgery. Sutures may tear out in the post-operative period but wounds will still heal with time. \par \par Risks and benefits discussed including post-operative pain, delayed wound healing, infection. Risk of anal stenosis discussed and potential that I will not be able to remove all of the hemorrhoidal tissue in one surgery.\par \par \par Will schedule patient for excisional hemorrhoidectomy. \par Will get Covid testing prior to surgery. Phone number provided to schedule testing appointment. Post-operative recovery and care instructions provided. \par \par \par

## 2023-05-10 NOTE — ASSESSMENT
[FreeTextEntry1] : Ms. CURRY is a 42 year female who presents for symptomatic hemorrhoids. \par \par Discussed management options for hemorrhoids including medical management with fiber supplements, increased fluid intake, and symptom management during acute flares, office based procedures including rubber band ligation, transanal hemorrhoidal dearterialization and surgical excision. \par \par Given the findings today on exam recommend surgical management with excisional hemorrhoidectomy.\par Discussed details of surgery including post-operative care and recovery. Should expect 10-14 days of pain that will improve each day. Can take 4-6 weeks to heal completely from the incisions. Small amounts of bleeding are normal and should resolve relatively soon after surgery. Sutures may tear out in the post-operative period but wounds will still heal with time. \par \par Risks and benefits discussed including post-operative pain, delayed wound healing, infection. Risk of anal stenosis discussed and potential that I will not be able to remove all of the hemorrhoidal tissue in one surgery.\par \par \par Will schedule patient for excisional hemorrhoidectomy. \par Will get Covid testing prior to surgery. Phone number provided to schedule testing appointment. Post-operative recovery and care instructions provided. \par \par

## 2023-05-17 ENCOUNTER — RESULT REVIEW (OUTPATIENT)
Age: 43
End: 2023-05-17

## 2023-05-25 ENCOUNTER — RESULT REVIEW (OUTPATIENT)
Age: 43
End: 2023-05-25

## 2023-05-26 ENCOUNTER — APPOINTMENT (OUTPATIENT)
Dept: SURGERY | Facility: HOSPITAL | Age: 43
End: 2023-05-26

## 2023-06-06 ENCOUNTER — APPOINTMENT (OUTPATIENT)
Dept: SURGERY | Facility: CLINIC | Age: 43
End: 2023-06-06

## 2023-06-14 ENCOUNTER — APPOINTMENT (OUTPATIENT)
Dept: SURGERY | Facility: CLINIC | Age: 43
End: 2023-06-14
Payer: COMMERCIAL

## 2023-06-14 VITALS
DIASTOLIC BLOOD PRESSURE: 75 MMHG | HEIGHT: 70 IN | WEIGHT: 141.8 LBS | OXYGEN SATURATION: 100 % | SYSTOLIC BLOOD PRESSURE: 115 MMHG | BODY MASS INDEX: 20.3 KG/M2 | HEART RATE: 68 BPM | RESPIRATION RATE: 18 BRPM

## 2023-06-14 PROCEDURE — XXXXX: CPT | Mod: 1L

## 2024-04-01 ENCOUNTER — APPOINTMENT (OUTPATIENT)
Dept: INTERNAL MEDICINE | Facility: CLINIC | Age: 44
End: 2024-04-01

## 2025-01-07 ENCOUNTER — NON-APPOINTMENT (OUTPATIENT)
Age: 45
End: 2025-01-07